# Patient Record
Sex: FEMALE | Race: WHITE | URBAN - METROPOLITAN AREA
[De-identification: names, ages, dates, MRNs, and addresses within clinical notes are randomized per-mention and may not be internally consistent; named-entity substitution may affect disease eponyms.]

---

## 2024-07-15 ENCOUNTER — INPATIENT (INPATIENT)
Facility: HOSPITAL | Age: 29
LOS: 2 days | Discharge: HOME | DRG: 833 | End: 2024-07-18
Attending: GENERAL ACUTE CARE HOSPITAL | Admitting: GENERAL ACUTE CARE HOSPITAL
Payer: COMMERCIAL

## 2024-07-15 VITALS — HEART RATE: 81 BPM | DIASTOLIC BLOOD PRESSURE: 66 MMHG | SYSTOLIC BLOOD PRESSURE: 121 MMHG

## 2024-07-15 DIAGNOSIS — Z90.89 ACQUIRED ABSENCE OF OTHER ORGANS: Chronic | ICD-10-CM

## 2024-07-15 DIAGNOSIS — O61.1 FAILED INSTRUMENTAL INDUCTION OF LABOR: ICD-10-CM

## 2024-07-15 LAB
ALBUMIN SERPL ELPH-MCNC: 3.7 G/DL — SIGNIFICANT CHANGE UP (ref 3.5–5.2)
ALP SERPL-CCNC: 149 U/L — HIGH (ref 30–115)
ALT FLD-CCNC: 10 U/L — SIGNIFICANT CHANGE UP (ref 0–41)
ANION GAP SERPL CALC-SCNC: 12 MMOL/L — SIGNIFICANT CHANGE UP (ref 7–14)
APPEARANCE UR: CLEAR — SIGNIFICANT CHANGE UP
AST SERPL-CCNC: 14 U/L — SIGNIFICANT CHANGE UP (ref 0–41)
BASOPHILS # BLD AUTO: 0.05 K/UL — SIGNIFICANT CHANGE UP (ref 0–0.2)
BASOPHILS NFR BLD AUTO: 0.4 % — SIGNIFICANT CHANGE UP (ref 0–1)
BILIRUB SERPL-MCNC: 0.2 MG/DL — SIGNIFICANT CHANGE UP (ref 0.2–1.2)
BILIRUB UR-MCNC: NEGATIVE — SIGNIFICANT CHANGE UP
BUN SERPL-MCNC: 12 MG/DL — SIGNIFICANT CHANGE UP (ref 10–20)
CALCIUM SERPL-MCNC: 8.7 MG/DL — SIGNIFICANT CHANGE UP (ref 8.4–10.5)
CHLORIDE SERPL-SCNC: 103 MMOL/L — SIGNIFICANT CHANGE UP (ref 98–110)
CO2 SERPL-SCNC: 21 MMOL/L — SIGNIFICANT CHANGE UP (ref 17–32)
COLOR SPEC: YELLOW — SIGNIFICANT CHANGE UP
CREAT SERPL-MCNC: 0.6 MG/DL — LOW (ref 0.7–1.5)
DIFF PNL FLD: NEGATIVE — SIGNIFICANT CHANGE UP
EGFR: 125 ML/MIN/1.73M2 — SIGNIFICANT CHANGE UP
EOSINOPHIL # BLD AUTO: 0.16 K/UL — SIGNIFICANT CHANGE UP (ref 0–0.7)
EOSINOPHIL NFR BLD AUTO: 1.4 % — SIGNIFICANT CHANGE UP (ref 0–8)
GLUCOSE SERPL-MCNC: 73 MG/DL — SIGNIFICANT CHANGE UP (ref 70–99)
GLUCOSE UR QL: NEGATIVE MG/DL — SIGNIFICANT CHANGE UP
HCT VFR BLD CALC: 33.4 % — LOW (ref 37–47)
HGB BLD-MCNC: 11.5 G/DL — LOW (ref 12–16)
HIV 1 & 2 AB SERPL IA.RAPID: SIGNIFICANT CHANGE UP
IMM GRANULOCYTES NFR BLD AUTO: 1.3 % — HIGH (ref 0.1–0.3)
KETONES UR-MCNC: NEGATIVE MG/DL — SIGNIFICANT CHANGE UP
LDH SERPL L TO P-CCNC: 212 — SIGNIFICANT CHANGE UP (ref 50–242)
LEUKOCYTE ESTERASE UR-ACNC: NEGATIVE — SIGNIFICANT CHANGE UP
LYMPHOCYTES # BLD AUTO: 18 % — LOW (ref 20.5–51.1)
LYMPHOCYTES # BLD AUTO: 2.13 K/UL — SIGNIFICANT CHANGE UP (ref 1.2–3.4)
MCHC RBC-ENTMCNC: 32.5 PG — HIGH (ref 27–31)
MCHC RBC-ENTMCNC: 34.4 G/DL — SIGNIFICANT CHANGE UP (ref 32–37)
MCV RBC AUTO: 94.4 FL — SIGNIFICANT CHANGE UP (ref 81–99)
MONOCYTES # BLD AUTO: 1.31 K/UL — HIGH (ref 0.1–0.6)
MONOCYTES NFR BLD AUTO: 11.1 % — HIGH (ref 1.7–9.3)
NEUTROPHILS # BLD AUTO: 8.02 K/UL — HIGH (ref 1.4–6.5)
NEUTROPHILS NFR BLD AUTO: 67.8 % — SIGNIFICANT CHANGE UP (ref 42.2–75.2)
NITRITE UR-MCNC: NEGATIVE — SIGNIFICANT CHANGE UP
NRBC # BLD: 0 /100 WBCS — SIGNIFICANT CHANGE UP (ref 0–0)
PH UR: 6.5 — SIGNIFICANT CHANGE UP (ref 5–8)
PLATELET # BLD AUTO: 133 K/UL — SIGNIFICANT CHANGE UP (ref 130–400)
PMV BLD: 13.1 FL — HIGH (ref 7.4–10.4)
POTASSIUM SERPL-MCNC: 4 MMOL/L — SIGNIFICANT CHANGE UP (ref 3.5–5)
POTASSIUM SERPL-SCNC: 4 MMOL/L — SIGNIFICANT CHANGE UP (ref 3.5–5)
PRENATAL SYPHILIS TEST: SIGNIFICANT CHANGE UP
PROT SERPL-MCNC: 5.9 G/DL — LOW (ref 6–8)
PROT UR-MCNC: NEGATIVE MG/DL — SIGNIFICANT CHANGE UP
RBC # BLD: 3.54 M/UL — LOW (ref 4.2–5.4)
RBC # FLD: 13.1 % — SIGNIFICANT CHANGE UP (ref 11.5–14.5)
SODIUM SERPL-SCNC: 136 MMOL/L — SIGNIFICANT CHANGE UP (ref 135–146)
SP GR SPEC: 1.01 — SIGNIFICANT CHANGE UP (ref 1–1.03)
URATE SERPL-MCNC: 5.6 MG/DL — SIGNIFICANT CHANGE UP (ref 2.5–7)
UROBILINOGEN FLD QL: 0.2 MG/DL — SIGNIFICANT CHANGE UP (ref 0.2–1)
WBC # BLD: 11.82 K/UL — HIGH (ref 4.8–10.8)
WBC # FLD AUTO: 11.82 K/UL — HIGH (ref 4.8–10.8)

## 2024-07-15 PROCEDURE — 85025 COMPLETE CBC W/AUTO DIFF WBC: CPT

## 2024-07-15 PROCEDURE — 82570 ASSAY OF URINE CREATININE: CPT

## 2024-07-15 PROCEDURE — 86703 HIV-1/HIV-2 1 RESULT ANTBDY: CPT

## 2024-07-15 PROCEDURE — 36415 COLL VENOUS BLD VENIPUNCTURE: CPT

## 2024-07-15 PROCEDURE — 84550 ASSAY OF BLOOD/URIC ACID: CPT

## 2024-07-15 PROCEDURE — 82962 GLUCOSE BLOOD TEST: CPT

## 2024-07-15 PROCEDURE — 83615 LACTATE (LD) (LDH) ENZYME: CPT

## 2024-07-15 PROCEDURE — 84156 ASSAY OF PROTEIN URINE: CPT

## 2024-07-15 PROCEDURE — 81003 URINALYSIS AUTO W/O SCOPE: CPT

## 2024-07-15 PROCEDURE — 86850 RBC ANTIBODY SCREEN: CPT

## 2024-07-15 PROCEDURE — 59050 FETAL MONITOR W/REPORT: CPT

## 2024-07-15 PROCEDURE — 86900 BLOOD TYPING SEROLOGIC ABO: CPT

## 2024-07-15 PROCEDURE — 80053 COMPREHEN METABOLIC PANEL: CPT

## 2024-07-15 PROCEDURE — 80354 DRUG SCREENING FENTANYL: CPT

## 2024-07-15 PROCEDURE — 80307 DRUG TEST PRSMV CHEM ANLYZR: CPT

## 2024-07-15 PROCEDURE — 86901 BLOOD TYPING SEROLOGIC RH(D): CPT

## 2024-07-15 PROCEDURE — 86592 SYPHILIS TEST NON-TREP QUAL: CPT

## 2024-07-15 RX ORDER — DEXTROSE MONOHYDRATE AND SODIUM CHLORIDE 5; .3 G/100ML; G/100ML
1000 INJECTION, SOLUTION INTRAVENOUS
Refills: 0 | Status: DISCONTINUED | OUTPATIENT
Start: 2024-07-15 | End: 2024-07-17

## 2024-07-15 RX ORDER — OXYTOCIN 30 [USP'U]/500ML
333.33 INJECTION, SOLUTION INTRAVENOUS
Qty: 20 | Refills: 0 | Status: DISCONTINUED | OUTPATIENT
Start: 2024-07-15 | End: 2024-07-17

## 2024-07-15 RX ADMIN — DEXTROSE MONOHYDRATE AND SODIUM CHLORIDE 125 MILLILITER(S): 5; .3 INJECTION, SOLUTION INTRAVENOUS at 21:05

## 2024-07-15 NOTE — OB PROVIDER H&P - NSHPLABSRESULTS_GEN_ALL_CORE
7/6/24  GC neg  24 hr urine: UprCr ratio 0.196  GBS neg    6/26/24  140/96/20/12/0.68<no gluc, AST/ALT 19/13, uric 4.2  Uprcr 0.2    5/14/24  GTT 84/180/163/69     5/10/24      2/26/24  AFP 41.1    12/18/23  Rubella immune  Varicella immune  Measles immune  GCCT neg  RPR NR  Ab neg  O+  HIV NR  Hep B NR  Hep C NR

## 2024-07-15 NOTE — OB RN PATIENT PROFILE - HEIGHT IN CM
1. I was told the name of the physician that took care of my child while in the hospital.    2. I have been told about any important findings on my child's physical exam and my child's plan of care.    3. The doctor clearly explained my child's diagnosis and other possible diagnoses that were considered.    4. My child's doctor explained all the tests that were done and their results (if available). I understand that some of the test results may not be ready before we go home and I was told how I can get these results. I understand that a summary of my child's hospitalization and important test results will be shared with my child's outpatient doctor.    5. My child's doctor talked to me about what I need to do when we go home.    6. I understand what signs and symptoms to watch for. I understand what symptoms I would need to call my doctor for and/or return to the hospital.    7. I have the phone number to call the hospital for results and/or questions after I leave the hospital.
[phone]

## 2024-07-15 NOTE — OB PROVIDER H&P - NSLOWPPHRISK_OBGYN_A_OB
[place]ahn Pregnancy/Less than or equal to 4 previous vaginal births/No known bleeding disorder/No history of postpartum hemorrhage

## 2024-07-15 NOTE — OB PROVIDER H&P - ASSESSMENT
25 y/o  at 37w1d presents for IOL due to gDMA2, on lantus 4, and PEC, on ASA 81 only, without severe features. Not currently in labor    - admission labs, PEC labs, 3T labs  - IVF  - clear liquid diet  - Cont EFM/toco  - pain management PRN, interested in epi  - cytotec 25 vaginally for induction   - q4 hr fingerstick in latent labor, q2 hr fingerstick in active labor 25 y/o  at 37w1d, GBS negative, presents for IOL due to gDMA2, on lantus 4, and PEC, on ASA 81 only, without severe features.  - admission labs, PEC labs, 3T labs  - IVF  - clear liquid diet  - vitals q15 min  - Cont EFM/toco  - pain management PRN, interested in epi  - cytotec 25 vaginally for induction   - q4 hr fingerstick in latent labor, q2 hr fingerstick in active labor    D/w Dr Bautista

## 2024-07-15 NOTE — OB PROVIDER H&P - HISTORY OF PRESENT ILLNESS
23 y/o  at 37w1d AGNES 24 dated by LMP presents for IOL. Pt feels well now, appears comfortable, and has no acute complaints. Denies HA, changes in vision, dizziness, n/v, chest pain, SOB, RUQ or other abdominal pain. Denies LOF, CTX, VB. Endorses good FM.     Pregnancy complicated by:  1) gDMA2: lantus 4, checks FS every day at home. Per pt fasting range: 100/115, postprandial range: , 150 2x/wk  2) PEC: asa 81, no BP meds. BP at home 124-132/76-80.    25 y/o  at 37w1d AGNES 24 dated by LMP presents for IOL for preeclampsia w/o sf and gdma2 on insulin. Pt feels well now, appears comfortable, and has no acute complaints. Denies HA, changes in vision, dizziness, n/v, chest pain, SOB, RUQ or other abdominal pain. Denies LOF, CTX, VB. Endorses good FM. Gbs negative.     Pregnancy complicated by:  1) gDMA2: lantus 4, checks FS every day at home. Per pt fasting range: 100/115, postprandial range: , 150 2x/wk  2) PEC: asa 81, no BP meds. BP at home 124-132/76-80.

## 2024-07-15 NOTE — OB RN PATIENT PROFILE - NS_MEANSOFARRIVAL_OBGYN_ALL_OB
[facility] CLINICAL PHARMACY REVIEW: ADHERENCE REVIEW  Identified care gap per Bebeto; fills at [facility]: ACE/ARB, Diabetes and Statin adherence      Last Visit: 21    ASSESSMENT  ACE/ARB ADHERENCE  Per Insurance Records through 21 :   RX:  Lisinopril 2.5 mg tab  last filled on 21 for 90 day supply. Next refill due: 21. Fail Date:  2022. Per Reconciled Dispense Report:  (same as above). Per Pharmacy: [facility]  RX: Lisinopril 2.5 mg tab   last picked up on 21 for 90 day supply. Refills remainin  Billed through [place]     BP Readings from Last 3 Encounters:   21 (!) 119/53   10/19/21 134/72   10/14/21 (!) 118/48     Estimated Creatinine Clearance: 38 mL/min (A) (based on SCr of 1.71 mg/dL (H)). DIABETES ADHERENCE  Per Insurance Records through 21 :   RX:  Glimepiride 4 mg tab   last filled on 21 for 90 day supply. Next refill due: 21. Fail Date:  21. Per Reconciled Dispense Report:  Last filled 21 for 90 day supply     Per Pharmacy: [facility]  RX: Glimepiride 4 mg tab   last picked up on 21 for 90 day supply. Refills remainin  Billed through [employer] Value Date    LABA1C 7.2 (H) 2021    LABA1C 6.9 2021    LABA1C 7.5 (H) 2020     NOTE A1c <9%    STATIN ADHERENCE  Per Insurance Records through 21 :   RX:  Simvastatin 40 mg tab  last filled on 21 for 90 day supply. Next refill due: 2022. Fail Date:  12.10.21. Per Reconciled Dispense Report:  (same as above). Per Pharmacy: [facility]  RX: Simvastatin 40 mg tab   last picked up on 21 for 90 day supply.    Refills remainin  Billed through [employer]   Component Value Date    CHOL 145 (L) 2020    TRIG 89 2020    HDL 43 2020    LDLCHOLESTEROL 74 2020    LDLCALC 84 2020     ALT   Date Value Ref Range Status   2021 20 0 - 40 U/L Final     AST   Date Value Ref Range Status   09/27/2021 21 0 - 41 U/L Final     The ASCVD Risk score (Tala Best., et al., 2013) failed to calculate for the following reasons: The 2013 ASCVD risk score is only valid for ages 36 to 78     PLAN  The following are interventions that have been identified:     - Patient is current with refills of Lisinopril, Glimepiride, and Simvastatin. No patient outreached planned at this time.         Future Appointments   Date Time Provider Jacqueline Sloan   1/14/2022  2:00 PM JAI Fofana - ANABELA OLSON   3/29/2022  8:30 AM SCHEDULE, [facility]   4/20/2022  1:40 PM Jing Herrera MD TIFF CARD [facility]   10/3/2022  9:00 AM Jing Herrera MD TIFF CARD [facility]       Tsering Johnson LPN  Population Health   [address] [facility],[address] Clinical Pharmacy  Phone: toll free [phone] Ambulatory

## 2024-07-15 NOTE — OB PROVIDER H&P - NSHPPHYSICALEXAM_GEN_ALL_CORE
Physical exam:    Vital Signs Last 24 Hrs  T(F): 98.6 (15 Jul 2024 20:16), Max: 98.6 (15 Jul 2024 20:16)  HR: 80 (15 Jul 2024 21:46) (75 - 84)  BP: 118/60 (15 Jul 2024 22:00) (109/59 - 126/67)  RR: 16 (15 Jul 2024 20:16) (16 - 16)    Gen: AAOx3, NAD  Heart: RRR, S1 S2 WNL  Lungs: CTAB  Abdomen: Soft, nontender, no distension, gravid  Ext: No calf tenderness    EFM: 150, mod binh, +accels, -decels. Cat I tracing.   toco: irritability  SVE: 0/0/-3  Speculum: deferred   BSS: vertex, anterior placenta  EFW by leopold: 2700

## 2024-07-16 LAB
CREAT ?TM UR-MCNC: 27 MG/DL — SIGNIFICANT CHANGE UP
GLUCOSE BLDC GLUCOMTR-MCNC: 98 MG/DL — SIGNIFICANT CHANGE UP (ref 70–99)
L&D DRUG SCREEN, URINE: SIGNIFICANT CHANGE UP
PROT ?TM UR-MCNC: <5 MG/DLG/24H — SIGNIFICANT CHANGE UP
PROT/CREAT UR-RTO: <0.2 RATIO — SIGNIFICANT CHANGE UP (ref 0–0.2)

## 2024-07-16 RX ORDER — OXYTOCIN 30 [USP'U]/500ML
2 INJECTION, SOLUTION INTRAVENOUS
Qty: 30 | Refills: 0 | Status: DISCONTINUED | OUTPATIENT
Start: 2024-07-16 | End: 2024-07-17

## 2024-07-16 RX ORDER — OXYTOCIN 30 [USP'U]/500ML
2 INJECTION, SOLUTION INTRAVENOUS
Qty: 30 | Refills: 0 | Status: DISCONTINUED | OUTPATIENT
Start: 2024-07-16 | End: 2024-07-16

## 2024-07-16 RX ADMIN — OXYTOCIN 2 MILLIUNIT(S)/MIN: 30 INJECTION, SOLUTION INTRAVENOUS at 10:43

## 2024-07-16 NOTE — OB RN DELIVERY SUMMARY - BABY A: APGAR 1 MIN COLOR, DELIVERY
Encounter addended by: Rosalva Stacy R.N. on: 7/10/2023 3:19 PM   Actions taken: Flowsheet accepted (1) body pink, extremities blue

## 2024-07-16 NOTE — OB PROVIDER LABOR PROGRESS NOTE - NS_SUBJECTIVE/OBJECTIVE_OBGYN_ALL_OB_FT
Pt seen and examined at bedside for progression of labor- reports pain well controlled by epidural    Vital Signs (24 Hrs):  T(C): 37 (24 @ 10:42), Max: 37 (07-15-24 @ 20:16)  HR: 72 (24 @ 14:31) (65 - 89)  BP: 115/57 (24 @ 14:28) (102/56 - 128/72)  RR: 18 (24 @ 10:42) (16 - 18)  SpO2: 97% (24 @ 14:31) (90% - 100%)    Gen: NAD  VE: CRB in place, unable to do accurate cervical exam  FHR: 130/mod/+accels  TOCO: every 2-5 min    MEdications:  s/p cytotec x 2  epidural placed at 0835  CRB placed at 1015    A/P: 25 y/o  at 37w1d, GBS negative,  gDMA2, on lantus 4, and PEC, on ASA 81 only, without severe features  s/p IOL on pitocin and CRB    continuous TOCO and EFM  Pitocin as clinically indicated  FS as ordered    Nola Bautista and Pranav aware

## 2024-07-16 NOTE — OB RN DELIVERY SUMMARY - NSSELHIDDEN_OBGYN_ALL_OB_FT
[NS_DeliveryAttending1_OBGYN_ALL_OB_FT:[email or web address]=],[NS_DeliveryRN_OBGYN_ALL_OB_FT:[id number]=] [NS_DeliveryAttending1_OBGYN_ALL_OB_FT:[email or web address]=],[NS_DeliveryRN_OBGYN_ALL_OB_FT:[id number]=],[NS_CirculateRN2_OBGYN_ALL_OB_FT:[email or web address]=]

## 2024-07-16 NOTE — OB PROVIDER LABOR PROGRESS NOTE - NS_SUBJECTIVE/OBJECTIVE_OBGYN_ALL_OB_FT
Pt seen and examined at bedside for progression of labor: reports large gush of fluid at 2015, continuous since. pain well controlled with epidural.      Vital Signs (24 Hrs):  T(C): 37 (24 @ 10:42), Max: 37 (24 @ 10:42)  HR: 76 (24 @ 20:11) (65 - 89)  BP: 103/51 (24 @ 20:00) (84/52 - 133/103)  RR: 18 (24 @ 10:42) (18 - 18)  SpO2: 97% (24 @ 20:11) (90% - 100%)  FS: 98    Gen: NAD  Abd: gravid, soft NT  VE: 6/70/-1, vtx ruptured, clear   CRB removed  FHR: 130/mod/+accels  TOCO: every 2 min   Ext: SCDs in place  Medications:  Pitocin started at 1043 currently at 16 mu/min   s/p cytotec x 2 and CRB x 1     A/P:25 y/o  at 37w2d, GBS negative, IOL for GDMA2 on lantus 4, and PEC without severe features s/p CRB,  on pitocin.     continuous TOCO and EFM  pitocin as clinically indicated  reevlauate for progression of labor in 4 hours or PRN  continue SCDs  Pain management prn    Dr Bautista aware

## 2024-07-16 NOTE — OB PROVIDER LABOR PROGRESS NOTE - NS_SUBJECTIVE/OBJECTIVE_OBGYN_ALL_OB_FT
pt seen and examained at bedside, pain well controlled with epidural, reports (+)  Reviewed with pt unchanged cervical exam with current ctx pattern of every 2-3 min.  Plan is to place cervical ripening balloon and start pitocin.  Reviewed procedure with patient and mother at bedside, she stated her understanding and agreed to placement. Pt understands need for pitocin is decided by clinical pattern of contractions.      Vital Signs (24 Hrs):  T(C): 37 (07-15-24 @ 20:16), Max: 37 (07-15-24 @ 20:16)  HR: 85 (24 @ 10:15) (65 - 89)  BP: 118/68 (24 @ 10:14) (102/56 - 128/72)  RR: 18 (24 @ 03:16) (16 - 18)  SpO2: 98% (24 @ 10:15) (97% - 100%)  FS by calibrated CGM  94    VE:1/50/-3 CRB placed , 80/80 fluid  FHR: 145/mod/+accels  TOCO: every 2-3 min   ext: b/l lower edema without pitting    medications:  s/p cytotec pv x 2 doses  epidural placed at 0835    A/P:23 y/o  at 37w2d, GBS negative, IOL for GDMA2 on lantus 4, and PEC without severe features on  CRB and pitocin as clinically indicated    continuous TOCO and EFM  pitocin as clinically indicated  reevlauate for progression of labor in 4 hours or PRN  SCDs to be placed    Dr Bautista aware

## 2024-07-16 NOTE — CHART NOTE - NSCHARTNOTEFT_GEN_A_CORE
PGY4 NOTE    Pt assessed at bedside for placement of cytotec 25mcg vaginally #2. SVE 1/0/-3. EFM category I, toco irregular.   D/w Dr Bautista

## 2024-07-17 LAB
BASOPHILS # BLD AUTO: 0.05 K/UL — SIGNIFICANT CHANGE UP (ref 0–0.2)
BASOPHILS NFR BLD AUTO: 0.3 % — SIGNIFICANT CHANGE UP (ref 0–1)
EOSINOPHIL # BLD AUTO: 0.1 K/UL — SIGNIFICANT CHANGE UP (ref 0–0.7)
EOSINOPHIL NFR BLD AUTO: 0.5 % — SIGNIFICANT CHANGE UP (ref 0–8)
HCT VFR BLD CALC: 34 % — LOW (ref 37–47)
HGB BLD-MCNC: 11.2 G/DL — LOW (ref 12–16)
IMM GRANULOCYTES NFR BLD AUTO: 0.9 % — HIGH (ref 0.1–0.3)
LYMPHOCYTES # BLD AUTO: 1.61 K/UL — SIGNIFICANT CHANGE UP (ref 1.2–3.4)
LYMPHOCYTES # BLD AUTO: 8.4 % — LOW (ref 20.5–51.1)
MCHC RBC-ENTMCNC: 31.6 PG — HIGH (ref 27–31)
MCHC RBC-ENTMCNC: 32.9 G/DL — SIGNIFICANT CHANGE UP (ref 32–37)
MCV RBC AUTO: 96 FL — SIGNIFICANT CHANGE UP (ref 81–99)
MONOCYTES # BLD AUTO: 1.67 K/UL — HIGH (ref 0.1–0.6)
MONOCYTES NFR BLD AUTO: 8.7 % — SIGNIFICANT CHANGE UP (ref 1.7–9.3)
NEUTROPHILS # BLD AUTO: 15.62 K/UL — HIGH (ref 1.4–6.5)
NEUTROPHILS NFR BLD AUTO: 81.2 % — HIGH (ref 42.2–75.2)
NRBC # BLD: 0 /100 WBCS — SIGNIFICANT CHANGE UP (ref 0–0)
PLATELET # BLD AUTO: 137 K/UL — SIGNIFICANT CHANGE UP (ref 130–400)
PMV BLD: 13.3 FL — HIGH (ref 7.4–10.4)
RBC # BLD: 3.54 M/UL — LOW (ref 4.2–5.4)
RBC # FLD: 12.7 % — SIGNIFICANT CHANGE UP (ref 11.5–14.5)
WBC # BLD: 19.23 K/UL — HIGH (ref 4.8–10.8)
WBC # FLD AUTO: 19.23 K/UL — HIGH (ref 4.8–10.8)

## 2024-07-17 RX ORDER — HYDROCORTISONE VALERATE 0.2 %
1 CREAM (GRAM) TOPICAL EVERY 6 HOURS
Refills: 0 | Status: DISCONTINUED | OUTPATIENT
Start: 2024-07-17 | End: 2024-07-18

## 2024-07-17 RX ORDER — DIPHENHYDRAMINE HCL 12.5MG/5ML
25 ELIXIR ORAL EVERY 6 HOURS
Refills: 0 | Status: DISCONTINUED | OUTPATIENT
Start: 2024-07-17 | End: 2024-07-18

## 2024-07-17 RX ORDER — PRENATAL VIT/IRON FUM/FOLIC AC 60 MG-1 MG
1 TABLET ORAL DAILY
Refills: 0 | Status: DISCONTINUED | OUTPATIENT
Start: 2024-07-17 | End: 2024-07-18

## 2024-07-17 RX ORDER — ACETAMINOPHEN 325 MG
975 TABLET ORAL
Refills: 0 | Status: DISCONTINUED | OUTPATIENT
Start: 2024-07-17 | End: 2024-07-18

## 2024-07-17 RX ORDER — DIBUCAINE 1 %
1 OINTMENT (GRAM) TOPICAL EVERY 6 HOURS
Refills: 0 | Status: DISCONTINUED | OUTPATIENT
Start: 2024-07-17 | End: 2024-07-18

## 2024-07-17 RX ORDER — PRAMOXINE HCL 1 %
1 CREAM (GRAM) RECTAL EVERY 4 HOURS
Refills: 0 | Status: DISCONTINUED | OUTPATIENT
Start: 2024-07-17 | End: 2024-07-18

## 2024-07-17 RX ORDER — TETANUS TOXOID, REDUCED DIPHTHERIA TOXOID AND ACELLULAR PERTUSSIS VACCINE, ADSORBED 5; 2.5; 8; 8; 2.5 [IU]/.5ML; [IU]/.5ML; UG/.5ML; UG/.5ML; UG/.5ML
0.5 SUSPENSION INTRAMUSCULAR ONCE
Refills: 0 | Status: DISCONTINUED | OUTPATIENT
Start: 2024-07-17 | End: 2024-07-18

## 2024-07-17 RX ORDER — HYDROCORTISONE ACETATE 1 %
1 OINTMENT (GRAM) RECTAL EVERY 4 HOURS
Refills: 0 | Status: DISCONTINUED | OUTPATIENT
Start: 2024-07-17 | End: 2024-07-18

## 2024-07-17 RX ORDER — KETOROLAC TROMETHAMINE 30 MG/ML
30 INJECTION, SOLUTION INTRAMUSCULAR ONCE
Refills: 0 | Status: DISCONTINUED | OUTPATIENT
Start: 2024-07-17 | End: 2024-07-17

## 2024-07-17 RX ORDER — OXYCODONE HYDROCHLORIDE 100 MG/5ML
5 SOLUTION ORAL ONCE
Refills: 0 | Status: DISCONTINUED | OUTPATIENT
Start: 2024-07-17 | End: 2024-07-18

## 2024-07-17 RX ORDER — OXYTOCIN 30 [USP'U]/500ML
41.67 INJECTION, SOLUTION INTRAVENOUS
Qty: 20 | Refills: 0 | Status: DISCONTINUED | OUTPATIENT
Start: 2024-07-17 | End: 2024-07-18

## 2024-07-17 RX ORDER — SODIUM CHLORIDE 0.9 % (FLUSH) 0.9 %
3 SYRINGE (ML) INJECTION EVERY 8 HOURS
Refills: 0 | Status: DISCONTINUED | OUTPATIENT
Start: 2024-07-17 | End: 2024-07-18

## 2024-07-17 RX ORDER — OXYCODONE HYDROCHLORIDE 100 MG/5ML
5 SOLUTION ORAL
Refills: 0 | Status: DISCONTINUED | OUTPATIENT
Start: 2024-07-17 | End: 2024-07-18

## 2024-07-17 RX ORDER — LANOLIN
1 WAX (GRAM) MISCELLANEOUS EVERY 6 HOURS
Refills: 0 | Status: DISCONTINUED | OUTPATIENT
Start: 2024-07-17 | End: 2024-07-18

## 2024-07-17 RX ORDER — BENZOCAINE 15 %
1 LIQUID (ML) TOPICAL EVERY 6 HOURS
Refills: 0 | Status: DISCONTINUED | OUTPATIENT
Start: 2024-07-17 | End: 2024-07-18

## 2024-07-17 RX ORDER — SIMETHICONE 40MG/0.6ML
80 SUSPENSION, DROPS(FINAL DOSAGE FORM)(ML) ORAL EVERY 4 HOURS
Refills: 0 | Status: DISCONTINUED | OUTPATIENT
Start: 2024-07-17 | End: 2024-07-18

## 2024-07-17 RX ADMIN — Medication 975 MILLIGRAM(S): at 11:37

## 2024-07-17 RX ADMIN — Medication 600 MILLIGRAM(S): at 15:18

## 2024-07-17 RX ADMIN — KETOROLAC TROMETHAMINE 30 MILLIGRAM(S): 30 INJECTION, SOLUTION INTRAMUSCULAR at 03:14

## 2024-07-17 RX ADMIN — Medication 600 MILLIGRAM(S): at 09:05

## 2024-07-17 RX ADMIN — Medication 3 MILLILITER(S): at 22:00

## 2024-07-17 RX ADMIN — Medication 975 MILLIGRAM(S): at 06:28

## 2024-07-17 RX ADMIN — Medication 975 MILLIGRAM(S): at 12:10

## 2024-07-17 RX ADMIN — Medication 600 MILLIGRAM(S): at 08:35

## 2024-07-17 RX ADMIN — Medication 600 MILLIGRAM(S): at 22:06

## 2024-07-17 RX ADMIN — Medication 3 MILLILITER(S): at 14:16

## 2024-07-17 RX ADMIN — OXYTOCIN 125 MILLIUNIT(S)/MIN: 30 INJECTION, SOLUTION INTRAVENOUS at 00:33

## 2024-07-17 RX ADMIN — KETOROLAC TROMETHAMINE 30 MILLIGRAM(S): 30 INJECTION, SOLUTION INTRAMUSCULAR at 03:44

## 2024-07-17 RX ADMIN — Medication 975 MILLIGRAM(S): at 17:35

## 2024-07-17 RX ADMIN — Medication 600 MILLIGRAM(S): at 15:50

## 2024-07-17 RX ADMIN — Medication 1 TABLET(S): at 11:37

## 2024-07-17 RX ADMIN — Medication 3 MILLILITER(S): at 06:20

## 2024-07-17 RX ADMIN — Medication 975 MILLIGRAM(S): at 18:05

## 2024-07-17 RX ADMIN — Medication 600 MILLIGRAM(S): at 21:36

## 2024-07-17 NOTE — OB PROVIDER DELIVERY SUMMARY - NSSELHIDDEN_OBGYN_ALL_OB_FT
[NS_DeliveryAttending1_OBGYN_ALL_OB_FT:[email or web address]=],[NS_DeliveryRN_OBGYN_ALL_OB_FT:[id number]=],[NS_CirculateRN2_OBGYN_ALL_OB_FT:[email or web address]=]

## 2024-07-18 VITALS
SYSTOLIC BLOOD PRESSURE: 116 MMHG | DIASTOLIC BLOOD PRESSURE: 70 MMHG | TEMPERATURE: 98 F | OXYGEN SATURATION: 96 % | RESPIRATION RATE: 18 BRPM | HEART RATE: 76 BPM

## 2024-07-18 RX ORDER — ACETAMINOPHEN 325 MG
3 TABLET ORAL
Qty: 0 | Refills: 0 | DISCHARGE
Start: 2024-07-18

## 2024-07-18 RX ORDER — PRENATAL VIT/IRON FUM/FOLIC AC 60 MG-1 MG
1 TABLET ORAL
Qty: 0 | Refills: 0 | DISCHARGE
Start: 2024-07-18

## 2024-07-18 RX ADMIN — Medication 3 MILLILITER(S): at 06:20

## 2024-07-18 RX ADMIN — Medication 975 MILLIGRAM(S): at 06:50

## 2024-07-18 RX ADMIN — Medication 975 MILLIGRAM(S): at 12:10

## 2024-07-18 RX ADMIN — Medication 600 MILLIGRAM(S): at 03:10

## 2024-07-18 RX ADMIN — Medication 975 MILLIGRAM(S): at 00:56

## 2024-07-18 RX ADMIN — Medication 975 MILLIGRAM(S): at 13:11

## 2024-07-18 RX ADMIN — Medication 3 MILLILITER(S): at 14:12

## 2024-07-18 RX ADMIN — Medication 1 TABLET(S): at 14:11

## 2024-07-18 RX ADMIN — Medication 600 MILLIGRAM(S): at 03:40

## 2024-07-18 RX ADMIN — Medication 975 MILLIGRAM(S): at 00:26

## 2024-07-18 NOTE — DISCHARGE NOTE OB - PATIENT PORTAL LINK FT
You can access the FollowMyHealth Patient Portal offered by [facility] by registering at the following website: [email or web address]. By joining [employer]’s FollowMyHealth portal, you will also be able to view your health information using other applications (apps) compatible with our system.

## 2024-07-18 NOTE — DISCHARGE NOTE OB - PLAN OF CARE
If you experience headache not relieved with Tylenol, visual changes such as seeing spots or blurry vision. pain in uppser part of abdomen call your doctor and return to L&D  If your blood presure is greater than 140 (top number or 90 (lower number) call your doctor  followup with your doctor in 1 week for blood pressure check Discharge home today, Nothing per vagina x 6 wks (no sex, tampons or douching), Followup 5-6 wks for post-partum care, If you experience severe pain, heavy vaginal bleeding or fever call your doctor

## 2024-07-18 NOTE — DISCHARGE NOTE OB - CARE PLAN
1 Principal Discharge DX:	Term pregnancy delivered  Assessment and plan of treatment:	Discharge home today, Nothing per vagina x 6 wks (no sex, tampons or douching), Followup 5-6 wks for post-partum care, If you experience severe pain, heavy vaginal bleeding or fever call your doctor  Secondary Diagnosis:	Preeclampsia  Assessment and plan of treatment:	If you experience headache not relieved with Tylenol, visual changes such as seeing spots or blurry vision. pain in uppser part of abdomen call your doctor and return to L&D  If your blood presure is greater than 140 (top number or 90 (lower number) call your doctor  followup with your doctor in 1 week for blood pressure check

## 2024-07-18 NOTE — DISCHARGE NOTE OB - CARE PROVIDERS DIRECT ADDRESSES
[email or web address]

## 2024-07-18 NOTE — PROGRESS NOTE ADULT - ASSESSMENT
A/P:25 y/o  at 37w2d, GBS negative, IOL for GDMA2 on lantus 4, and PEC without severe features, in active labor    continuous TOCO and EFM  c/w pitocin  continue SCDs  Pain management prn  f/u BPs  reevaluate    d/w Dr. mcpherson
23 y/o  at 37w2d, GBS negative, IOL for GDMA2 on lantus 4, and PEC without severe features,    - IVF  - clear liquid diet  - vitals q15 min  - Cont EFM/toco  - pain management PRN  - q4 hr fingerstick in latent labor, q2 hr fingerstick in active labor  -c/w IOL with CRB after patient gets epidural    D/w Dr Bautista  
PPD#1 s/p  doing well    - routine pp care    - possible D/C home today

## 2024-07-18 NOTE — DISCHARGE NOTE OB - MEDICATION SUMMARY - MEDICATIONS TO STOP TAKING
I will STOP taking the medications listed below when I get home from the hospital:    amoxicillin-clavulanate 875 mg-125 mg oral tablet  -- 1 tab(s) by mouth 2 times a day   -- Finish all this medication unless otherwise directed by prescriber.  Take with food or milk.    oxyCODONE-acetaminophen 5 mg-325 mg oral tablet  -- 1 tab(s) by mouth every 6 hours, As Needed -for moderate pain MDD:4   -- Caution federal law prohibits the transfer of this drug to any person other  than the person for whom it was prescribed.  May cause drowsiness.  Alcohol may intensify this effect.  Use care when operating dangerous machinery.  This prescription cannot be refilled.  This product contains acetaminophen.  Do not use  with any other product containing acetaminophen to prevent possible liver damage.  Using more of this medication than prescribed may cause serious breathing problems.

## 2024-07-18 NOTE — DISCHARGE NOTE OB - HOSPITAL COURSE
DATE OF DISCHARGE: 24 @ 15:08    HISTORY OF PRESENT ILLNESS/HOSPITAL COURSE: HPI:  23 y/o  at 37w1d AGNES 24 dated by LMP presents for IOL for preeclampsia w/o sf and gdma2 on insulin. Pt feels well now, appears comfortable, and has no acute complaints. Denies HA, changes in vision, dizziness, n/v, chest pain, SOB, RUQ or other abdominal pain. Denies LOF, CTX, VB. Endorses good FM. Gbs negative.     Pregnancy complicated by:  1) gDMA2: lantus 4, checks FS every day at home. Per pt fasting range: 100/115, postprandial range: , 150 2x/wk  2) PEC: asa 81, no BP meds. BP at home 124-132/76-80.    (15 Jul 2024 21:22)    PAST MEDICAL & SURGICAL HISTORY:  No pertinent past medical history      History of tonsillectomy          LABS:                       11.2   19.23 )-----------( 137      ( 2024 11:15 )             34.0

## 2024-07-18 NOTE — DISCHARGE NOTE OB - CARE PROVIDER_API CALL
Valorie Bautista  Obstetrics and Gynecology  [address] [place] [id number]  Phone: [phone]  Fax: [phone]  Follow Up Time:

## 2024-07-18 NOTE — PROGRESS NOTE ADULT - SUBJECTIVE AND OBJECTIVE BOX
Left patient a detailed message with results on self identified VM.
Pls call pt  Kidney fn is good on Jardiance 25 mg   Continue present meds. 
PGY 3 Note    Patient seen at bedside for evaluation of labor progression. Pt doing well. Denies pain. Would like epidural prior to CRB placement    T(F): 98.6 (20:16)  HR: 74 (01:44)  BP: 123/61 (02:00)  RR: 16 (20:16)    EFM: 135/mod/+accels  TOCO: q5m  SVE: 1/0/-3    Labs:                        11.5   11.82 )-----------( 133      ( 15 Jul 2024 21:05 )             33.4     07-15    136  |  103  |  12  ----------------------------<  73  4.0   |  21  |  0.6<L>    Ca    8.7      15 Jul 2024 21:05    TPro  5.9<L>  /  Alb  3.7  /  TBili  0.2  /  DBili  x   /  AST  14  /  ALT  10  /  AlkPhos  149<H>  07-15    ABO RH Interpretation: O POS (07-15-24 @ 21:05)    Urinalysis Basic - ( 15 Jul 2024 21:05 )    Color: Yellow / Appearance: Clear / S.006 / pH: x  Gluc: 73 mg/dL / Ketone: Negative mg/dL  / Bili: Negative / Urobili: 0.2 mg/dL   Blood: x / Protein: Negative mg/dL / Nitrite: Negative   Leuk Esterase: Negative / RBC: x / WBC x   Sq Epi: x / Non Sq Epi: x / Bacteria: x          Meds: lactated ringers. 1000 milliLiter(s) IV Continuous <Continuous>  oxytocin Infusion 333.333 milliUNIT(s)/Min IV Continuous <Continuous>    
PGY 3 Note    Patient seen at bedside for evaluation of labor progression. Pt reporting significant rectal pressure and urge to push.     T(F): 98.24 (22:29)  HR: 84 (23:25)  BP: 140/74 (23:14)      EFM: 130/mod/+accels  TOCO: q2-3m  SVE: 9.5/100/+1, non reducible lip    Labs:                        11.5   11.82 )-----------( 133      ( 15 Jul 2024 21:05 )             33.4     07-15    136  |  103  |  12  ----------------------------<  73  4.0   |  21  |  0.6<L>    Ca    8.7      15 Jul 2024 21:05    TPro  5.9<L>  /  Alb  3.7  /  TBili  0.2  /  DBili  x   /  AST  14  /  ALT  10  /  AlkPhos  149<H>  07-15      Urinalysis Basic - ( 15 Jul 2024 21:05 )    Color: Yellow / Appearance: Clear / S.006 / pH: x  Gluc: 73 mg/dL / Ketone: Negative mg/dL  / Bili: Negative / Urobili: 0.2 mg/dL   Blood: x / Protein: Negative mg/dL / Nitrite: Negative   Leuk Esterase: Negative / RBC: x / WBC x   Sq Epi: x / Non Sq Epi: x / Bacteria: x          Meds: lactated ringers. 1000 milliLiter(s) IV Continuous <Continuous>  oxytocin Infusion 333.333 milliUNIT(s)/Min IV Continuous <Continuous>  oxytocin Infusion. 2 milliUNIT(s)/Min IV Continuous <Continuous>    
Pt OOB, voiding, pain well controlled.

## 2024-07-19 PROBLEM — Z00.00 ENCOUNTER FOR PREVENTIVE HEALTH EXAMINATION: Status: ACTIVE | Noted: 2024-07-19

## 2024-07-24 DIAGNOSIS — Z3A.37 37 WEEKS GESTATION OF PREGNANCY: ICD-10-CM

## 2024-07-24 DIAGNOSIS — Z28.09 IMMUNIZATION NOT CARRIED OUT BECAUSE OF OTHER CONTRAINDICATION: ICD-10-CM

## 2025-07-10 ENCOUNTER — TRANSCRIPTION ENCOUNTER (OUTPATIENT)
Age: 30
End: 2025-07-10

## 2025-07-11 ENCOUNTER — APPOINTMENT (OUTPATIENT)
Dept: OBGYN | Facility: CLINIC | Age: 30
End: 2025-07-11
Payer: COMMERCIAL

## 2025-07-11 VITALS — SYSTOLIC BLOOD PRESSURE: 117 MMHG | DIASTOLIC BLOOD PRESSURE: 76 MMHG | WEIGHT: 138 LBS

## 2025-07-11 VITALS — BODY MASS INDEX: 24.45 KG/M2 | HEIGHT: 63 IN

## 2025-07-11 PROCEDURE — 0500F INITIAL PRENATAL CARE VISIT: CPT

## 2025-07-15 LAB
APPEARANCE: CLEAR
BILIRUBIN URINE: NEGATIVE
BLOOD URINE: NEGATIVE
COLOR: YELLOW
GLUCOSE QUALITATIVE U: NEGATIVE
KETONES URINE: NEGATIVE
LEUKOCYTE ESTERASE URINE: NEGATIVE
NITRITE URINE: NEGATIVE
PH URINE: 5.5
PROTEIN URINE: NEGATIVE
SPECIFIC GRAVITY URINE: >=1.03
UROBILINOGEN URINE: 0.2 (ref 0.2–?)

## 2025-07-22 ENCOUNTER — APPOINTMENT (OUTPATIENT)
Dept: OBGYN | Facility: CLINIC | Age: 30
End: 2025-07-22

## 2025-07-22 DIAGNOSIS — Z34.90 ENCOUNTER FOR SUPERVISION OF NORMAL PREGNANCY, UNSPECIFIED, UNSPECIFIED TRIMESTER: ICD-10-CM

## 2025-07-23 LAB
ABORH: NORMAL
ANTIBODY SCREEN: NORMAL
BASOPHILS # BLD AUTO: 0.04 K/UL
BASOPHILS NFR BLD AUTO: 0.4 %
EOSINOPHIL # BLD AUTO: 0.15 K/UL
EOSINOPHIL NFR BLD AUTO: 1.4 %
ESTIMATED AVERAGE GLUCOSE: 97 MG/DL
HBA1C MFR BLD HPLC: 5 %
HBV SURFACE AG SER QL: NONREACTIVE
HCT VFR BLD CALC: 41.1 %
HCV AB SER QL: NONREACTIVE
HCV S/CO RATIO: 0.05 COI
HGB A MFR BLD: 97.3 %
HGB A2 MFR BLD: 2.7 %
HGB BLD-MCNC: 13.8 G/DL
HGB FRACT BLD-IMP: NORMAL
IMM GRANULOCYTES NFR BLD AUTO: 0.6 %
LYMPHOCYTES # BLD AUTO: 2.05 K/UL
LYMPHOCYTES NFR BLD AUTO: 19 %
MAN DIFF?: NORMAL
MCHC RBC-ENTMCNC: 31.9 PG
MCHC RBC-ENTMCNC: 33.6 G/DL
MCV RBC AUTO: 94.9 FL
MONOCYTES # BLD AUTO: 0.95 K/UL
MONOCYTES NFR BLD AUTO: 8.8 %
NEUTROPHILS # BLD AUTO: 7.52 K/UL
NEUTROPHILS NFR BLD AUTO: 69.8 %
PLATELET # BLD AUTO: 174 K/UL
PMV BLD AUTO: 0 /100 WBCS
PMV BLD: 12.2 FL
RBC # BLD: 4.33 M/UL
RBC # FLD: 11.9 %
TSH SERPL-ACNC: 1.6 UIU/ML
WBC # FLD AUTO: 10.77 K/UL

## 2025-07-29 ENCOUNTER — APPOINTMENT (OUTPATIENT)
Dept: OBGYN | Facility: CLINIC | Age: 30
End: 2025-07-29
Payer: COMMERCIAL

## 2025-07-29 VITALS
DIASTOLIC BLOOD PRESSURE: 79 MMHG | SYSTOLIC BLOOD PRESSURE: 119 MMHG | WEIGHT: 153 LBS | HEIGHT: 63 IN | BODY MASS INDEX: 27.11 KG/M2

## 2025-07-29 LAB
APPEARANCE: CLEAR
BILIRUBIN URINE: NEGATIVE
BLOOD URINE: NEGATIVE
COLOR: YELLOW
GLUCOSE QUALITATIVE U: NEGATIVE
KETONES URINE: NEGATIVE
LEUKOCYTE ESTERASE URINE: NEGATIVE
NITRITE URINE: NEGATIVE
PH URINE: 5.5
PROTEIN URINE: NEGATIVE
SPECIFIC GRAVITY URINE: <=1.005
UROBILINOGEN URINE: 0.2 (ref 0.2–?)

## 2025-07-29 PROCEDURE — 0502F SUBSEQUENT PRENATAL CARE: CPT

## 2025-08-06 LAB
GENE DIS ANL CARRIER INTERP-IMP: NORMAL
MEV IGG FLD QL IA: 90 AU/ML
MEV IGG+IGM SER-IMP: POSITIVE
RUBV IGG FLD-ACNC: 20.4 INDEX
RUBV IGG SER-IMP: POSITIVE
RUBV IGM FLD-ACNC: <20 AU/ML
T GONDII AB SER-IMP: NEGATIVE
T GONDII AB SER-IMP: NEGATIVE
T GONDII IGG SER QL: <3 IU/ML
T GONDII IGM SER QL: <3 AU/ML
VZV AB TITR SER: POSITIVE
VZV IGG SER IF-ACNC: 6.29 S/CO

## 2025-08-08 ENCOUNTER — NON-APPOINTMENT (OUTPATIENT)
Age: 30
End: 2025-08-08

## 2025-08-11 ENCOUNTER — ASOB RESULT (OUTPATIENT)
Age: 30
End: 2025-08-11

## 2025-08-11 ENCOUNTER — APPOINTMENT (OUTPATIENT)
Dept: OBGYN | Facility: CLINIC | Age: 30
End: 2025-08-11
Payer: COMMERCIAL

## 2025-08-11 PROCEDURE — 76813 OB US NUCHAL MEAS 1 GEST: CPT | Mod: 26

## 2025-08-11 PROCEDURE — 76813 OB US NUCHAL MEAS 1 GEST: CPT | Mod: TC

## 2025-08-22 ENCOUNTER — APPOINTMENT (OUTPATIENT)
Dept: OBGYN | Facility: CLINIC | Age: 30
End: 2025-08-22
Payer: COMMERCIAL

## 2025-08-22 PROCEDURE — 0502F SUBSEQUENT PRENATAL CARE: CPT

## 2025-09-19 ENCOUNTER — APPOINTMENT (OUTPATIENT)
Dept: OBGYN | Facility: CLINIC | Age: 30
End: 2025-09-19

## 2025-09-22 PROBLEM — Z3A.18 18 WEEKS GESTATION OF PREGNANCY: Status: ACTIVE | Noted: 2025-09-22
